# Patient Record
Sex: MALE | Race: WHITE | NOT HISPANIC OR LATINO
[De-identification: names, ages, dates, MRNs, and addresses within clinical notes are randomized per-mention and may not be internally consistent; named-entity substitution may affect disease eponyms.]

---

## 2020-05-01 ENCOUNTER — APPOINTMENT (OUTPATIENT)
Dept: VASCULAR SURGERY | Facility: CLINIC | Age: 40
End: 2020-05-01
Payer: COMMERCIAL

## 2020-05-01 PROCEDURE — 99204 OFFICE O/P NEW MOD 45 MIN: CPT

## 2020-06-23 PROBLEM — Z00.00 ENCOUNTER FOR PREVENTIVE HEALTH EXAMINATION: Status: ACTIVE | Noted: 2020-06-23

## 2020-06-24 ENCOUNTER — APPOINTMENT (OUTPATIENT)
Dept: VASCULAR SURGERY | Facility: CLINIC | Age: 40
End: 2020-06-24
Payer: COMMERCIAL

## 2020-06-24 PROCEDURE — 99214 OFFICE O/P EST MOD 30 MIN: CPT

## 2021-03-05 ENCOUNTER — APPOINTMENT (OUTPATIENT)
Dept: VASCULAR SURGERY | Facility: CLINIC | Age: 41
End: 2021-03-05
Payer: COMMERCIAL

## 2021-03-05 PROCEDURE — 99213 OFFICE O/P EST LOW 20 MIN: CPT

## 2021-03-05 PROCEDURE — 99072 ADDL SUPL MATRL&STAF TM PHE: CPT

## 2021-03-25 ENCOUNTER — APPOINTMENT (OUTPATIENT)
Dept: VASCULAR SURGERY | Facility: CLINIC | Age: 41
End: 2021-03-25
Payer: COMMERCIAL

## 2021-03-25 VITALS — BODY MASS INDEX: 29.68 KG/M2 | WEIGHT: 212 LBS | HEIGHT: 71 IN

## 2021-03-25 DIAGNOSIS — Z78.9 OTHER SPECIFIED HEALTH STATUS: ICD-10-CM

## 2021-03-25 DIAGNOSIS — I10 ESSENTIAL (PRIMARY) HYPERTENSION: ICD-10-CM

## 2021-03-25 DIAGNOSIS — Z86.39 PERSONAL HISTORY OF OTHER ENDOCRINE, NUTRITIONAL AND METABOLIC DISEASE: ICD-10-CM

## 2021-03-25 PROCEDURE — 99072 ADDL SUPL MATRL&STAF TM PHE: CPT

## 2021-03-25 PROCEDURE — 99213 OFFICE O/P EST LOW 20 MIN: CPT

## 2021-03-25 PROCEDURE — 93971 EXTREMITY STUDY: CPT

## 2021-03-25 RX ORDER — IBUPROFEN 600 MG
600 TABLET ORAL
Refills: 0 | Status: ACTIVE | COMMUNITY

## 2021-03-25 RX ORDER — DEXTROAMPHETAMINE SACCHARATE, AMPHETAMINE ASPARTATE, DEXTROAMPHETAMINE SULFATE, AND AMPHETAMINE SULFATE 3.75; 3.75; 3.75; 3.75 MG/1; MG/1; MG/1; MG/1
TABLET ORAL
Refills: 0 | Status: ACTIVE | COMMUNITY

## 2021-03-25 NOTE — REVIEW OF SYSTEMS
[Lower Ext Edema] : lower extremity edema [Limb Pain] : limb pain [As Noted in HPI] : as noted in HPI [Negative] : Psychiatric

## 2021-03-25 NOTE — ASSESSMENT
[FreeTextEntry1] : 41-year-old male with a history of chronic left leg edema, significant venous stasis as well as on and off superficial venous ulcerations.  He was recently seen by me at the wound care center for ulcers which were healed with the use of Unna boots and compression therapy.  He has been wearing grade 2 compression garments on a daily basis.  He underwent a venous ultrasound of his left leg which revealed venous reflux.  We discussed treatment of his left superficial venous reflux with ablation to help with his edema and prevent recurrence of venous ulceration.  We discussed that daily use of compression therapy remains imperative.  He understands and agrees.  He wishes to proceed with left GSV ablation.  We will schedule the procedure as per his schedule.
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
## 2021-03-25 NOTE — PHYSICAL EXAM
[Normal Breath Sounds] : Normal breath sounds [2+] : left 2+ [Ankle Swelling (On Exam)] : present [Ankle Swelling Bilaterally] : bilaterally  [] : bilaterally [Ankle Swelling On The Left] : moderate [Alert] : alert [Oriented to Person] : oriented to person [Oriented to Place] : oriented to place [Calm] : calm [de-identified] : NAD [de-identified] : NCAT [de-identified] : supple [de-identified] : soft, NT, ND; no palpable masses [de-identified] : venous stasis changes both shins, healed ulcers left distal shin

## 2021-04-29 ENCOUNTER — APPOINTMENT (OUTPATIENT)
Dept: VASCULAR SURGERY | Facility: CLINIC | Age: 41
End: 2021-04-29
Payer: COMMERCIAL

## 2021-04-29 PROCEDURE — 99072 ADDL SUPL MATRL&STAF TM PHE: CPT

## 2021-04-29 PROCEDURE — 36475 ENDOVENOUS RF 1ST VEIN: CPT | Mod: LT

## 2021-04-29 NOTE — PROCEDURE
[FreeTextEntry1] : left leg RFA of GSV [FreeTextEntry2] : symptomatic venous reflux [FreeTextEntry3] : The patient was seen in the waiting room where the consent was obtained. He was then taken to the procedure room where a timeout was called to verify the patient's identity and the laterality of the procedure. The patient's left leg was then interrogated under ultrasound and an appropriate place for cannulation was identified. The leg was then prepped and draped in the standard fashion. Under ultrasound guidance the patient was given an injection of 1% plain lidocaine in the calf. Access was then obtained with a 7 FR sheath in the standard fashion. Catheter was placed to the SFJ and withdrawn 2.5 cm from the junction. Patient was then given tumescence around the saphenous vein under ultrasound guidance. The ablation was then performed (9 cycles for 3 minutes at 120 celsius). Steri strips were applied to the catheter insertion site. Leg was wrapped in kerlix, and an ace wrap. Patient was discharged in stable condition.

## 2021-05-06 ENCOUNTER — APPOINTMENT (OUTPATIENT)
Dept: VASCULAR SURGERY | Facility: CLINIC | Age: 41
End: 2021-05-06

## 2021-05-20 ENCOUNTER — APPOINTMENT (OUTPATIENT)
Dept: VASCULAR SURGERY | Facility: CLINIC | Age: 41
End: 2021-05-20
Payer: COMMERCIAL

## 2021-05-20 DIAGNOSIS — I87.2 VENOUS INSUFFICIENCY (CHRONIC) (PERIPHERAL): ICD-10-CM

## 2021-05-20 DIAGNOSIS — L97.929 VARICOSE VEINS OF LEFT LOWER EXTREMITY WITH ULCER OF UNSPECIFIED SITE: ICD-10-CM

## 2021-05-20 DIAGNOSIS — I83.029 VARICOSE VEINS OF LEFT LOWER EXTREMITY WITH ULCER OF UNSPECIFIED SITE: ICD-10-CM

## 2021-05-20 PROCEDURE — 93971 EXTREMITY STUDY: CPT

## 2021-05-20 PROCEDURE — 99213 OFFICE O/P EST LOW 20 MIN: CPT

## 2021-05-20 PROCEDURE — 99072 ADDL SUPL MATRL&STAF TM PHE: CPT
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
## 2021-05-20 NOTE — PHYSICAL EXAM
[Normal Breath Sounds] : Normal breath sounds [Ankle Swelling (On Exam)] : present [2+] : left 2+ [Ankle Swelling Bilaterally] : bilaterally  [] : bilaterally [Alert] : alert [Ankle Swelling On The Left] : moderate [Oriented to Person] : oriented to person [Oriented to Place] : oriented to place [Calm] : calm [de-identified] : NAD [de-identified] : NCAT [de-identified] : supple [de-identified] : soft, NT, ND; no palpable masses [de-identified] : venous stasis changes both shins, healed ulcers left distal shin

## 2021-05-20 NOTE — ASSESSMENT
[FreeTextEntry1] : 41-year-old male with a history of chronic left leg edema, significant venous stasis as well as on and off superficial venous ulcerations.  He was recently seen by me at the wound care center for ulcers which were healed with the use of Unna boots and compression therapy.  He has been wearing grade 2 compression garments on a daily basis.  He underwent a venous ultrasound of his left leg which revealed venous reflux. \par He is 2-week status post endovenous ablation of the left GSV.  His post procedure ultrasound reveals good results.\par I have advised him to continue to use her compression garments on a daily basis.  He will follow up with me if he has any recurrence of ulcerations or edema.

## 2023-12-14 ENCOUNTER — APPOINTMENT (OUTPATIENT)
Dept: VASCULAR SURGERY | Facility: CLINIC | Age: 43
End: 2023-12-14
Payer: COMMERCIAL

## 2023-12-14 PROCEDURE — 99213 OFFICE O/P EST LOW 20 MIN: CPT
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
## 2024-01-03 NOTE — PHYSICAL EXAM
[Normal Breath Sounds] : Normal breath sounds [2+] : left 2+ [Ankle Swelling (On Exam)] : present [Ankle Swelling Bilaterally] : bilaterally  [] : bilaterally [Ankle Swelling On The Left] : moderate [Alert] : alert [Oriented to Person] : oriented to person [Oriented to Place] : oriented to place [Calm] : calm [de-identified] : NAD [de-identified] : NCAT [de-identified] : supple [de-identified] : soft, NT, ND; no palpable masses [de-identified] : venous stasis changes both shins, left medial foot ulceration healed.

## 2024-01-03 NOTE — ASSESSMENT
[FreeTextEntry1] : 43-year-old male with a history of chronic left leg edema, significant venous stasis as well as on and off superficial venous ulcerations. He was recently seen by me at the wound care center for ulcers which were healed with the use of Unna boots and compression therapy. He has been wearing grade 2 compression garments on a daily basis. He underwent a venous ultrasound of his left leg which revealed venous reflux. He is status post endovenous ablation of the left GSV. His post procedure ultrasound reveals good results.  On follow up, the patient was undergoing unna boot treatment for a left foot ulceration, which has healed.  Will assess for recurrent venous reflux given his recurrent venous ulceration which is now healed.  He will follow-up with me after testing performed.

## 2024-01-03 NOTE — END OF VISIT
[FreeTextEntry3] : All medical record entries made by the [facility] were at my, CHERRY MCKEON, direction and personally dictated by me on 12/14/2023. I have reviewed the chart and agree that the record accurately reflects my personal performance of the history, physical exam, assessment and plan. I have also personally directed, reviewed, and agreed with the chart. [Time Spent: ___ minutes] : I have spent [unfilled] minutes of time on the encounter.

## 2025-03-19 ENCOUNTER — RESULT REVIEW (OUTPATIENT)
Age: 45
End: 2025-03-19